# Patient Record
Sex: MALE | Race: WHITE | Employment: OTHER | URBAN - METROPOLITAN AREA
[De-identification: names, ages, dates, MRNs, and addresses within clinical notes are randomized per-mention and may not be internally consistent; named-entity substitution may affect disease eponyms.]

---

## 2019-01-15 ENCOUNTER — OFFICE VISIT (OUTPATIENT)
Dept: PHYSICAL THERAPY | Facility: HOSPITAL | Age: 69
End: 2019-01-15
Attending: PHYSICAL MEDICINE & REHABILITATION
Payer: COMMERCIAL

## 2019-01-15 ENCOUNTER — ORDER TRANSCRIPTION (OUTPATIENT)
Dept: PHYSICAL THERAPY | Facility: HOSPITAL | Age: 69
End: 2019-01-15

## 2019-01-15 DIAGNOSIS — M79.18 MYOFASCIAL PAIN: Primary | ICD-10-CM

## 2019-01-15 DIAGNOSIS — M79.18 MYOFASCIAL PAIN: ICD-10-CM

## 2019-01-15 DIAGNOSIS — M25.60 LIMITED JOINT RANGE OF MOTION: ICD-10-CM

## 2019-01-15 PROCEDURE — 97161 PT EVAL LOW COMPLEX 20 MIN: CPT

## 2019-01-15 NOTE — PROGRESS NOTES
LUMBAR SPINE EVALUATION:   Referring Physician: Dr. Shawn Cormier  Date of Onset: 5/2018 Date of Service: 1/15/2019   Diagnosis: Myofascial pain (M79.18)  Limited joint range of motion (M25.60), Quadratus, ITB syndrome  PATIENT SUMMARY:   Denise Diaz medical history was reviewed with Marino Pisano. Significant findings include: negative for . personal hx of CA, diabetes or cardiac condition      ASSESSMENT:     Marino Pisano would benefit from skilled Physical Therapy to address the above impairments to increase provocation    Frequency / Duration: Patient will be seen for 2x/week or a total of 12 visits over a 90 day period.   Treatment will include: Manual Therapy, Neuromuscular Re-education, Self-Care Home Management, Therapeutic Activities, Therapeutic Exercise

## 2019-01-17 ENCOUNTER — OFFICE VISIT (OUTPATIENT)
Dept: PHYSICAL THERAPY | Facility: HOSPITAL | Age: 69
End: 2019-01-17
Attending: PHYSICAL MEDICINE & REHABILITATION
Payer: COMMERCIAL

## 2019-01-17 PROCEDURE — 97140 MANUAL THERAPY 1/> REGIONS: CPT

## 2019-01-17 PROCEDURE — 97110 THERAPEUTIC EXERCISES: CPT

## 2019-01-17 NOTE — PROGRESS NOTES
Diagnosis:  Myofascial pain (M79.18)  Limited joint range of motion (M25.60), Quadratus, ITB syndrome    Insurance:  [facility] PPO  Authorized # of Visits:  2/12        Next MD visit: none scheduled  Fall Risk: standard         Precautions: Gentlauratrajustine 18

## 2019-01-21 ENCOUNTER — OFFICE VISIT (OUTPATIENT)
Dept: PHYSICAL THERAPY | Facility: HOSPITAL | Age: 69
End: 2019-01-21
Attending: PHYSICAL MEDICINE & REHABILITATION
Payer: COMMERCIAL

## 2019-01-21 PROCEDURE — 97140 MANUAL THERAPY 1/> REGIONS: CPT

## 2019-01-21 PROCEDURE — 97110 THERAPEUTIC EXERCISES: CPT

## 2019-01-21 NOTE — PROGRESS NOTES
Diagnosis:  Myofascial pain (M79.18)  Limited joint range of motion (M25.60), Quadratus, ITB syndrome    Insurance:  [facility] PPO  Authorized # of Visits:  3/12        Next MD visit: none scheduled  Fall Risk: standard         Precautions: Franca 18

## 2019-01-29 ENCOUNTER — OFFICE VISIT (OUTPATIENT)
Dept: PHYSICAL THERAPY | Facility: HOSPITAL | Age: 69
End: 2019-01-29
Attending: PHYSICAL MEDICINE & REHABILITATION
Payer: COMMERCIAL

## 2019-01-29 PROCEDURE — 97140 MANUAL THERAPY 1/> REGIONS: CPT

## 2019-01-29 PROCEDURE — 97110 THERAPEUTIC EXERCISES: CPT

## 2019-01-29 NOTE — PROGRESS NOTES
Diagnosis:  Myofascial pain (M79.18)  Limited joint range of motion (M25.60), Quadratus, ITB syndrome    Insurance:  [facility] PPO  Authorized # of Visits:  4/12        Next MD visit: none scheduled  Fall Risk: standard         Precautions: n/a           Medica There 1       Total Timed Treatment: 38 min  Total Treatment Time: 45 min

## 2019-01-31 ENCOUNTER — OFFICE VISIT (OUTPATIENT)
Dept: PHYSICAL THERAPY | Facility: HOSPITAL | Age: 69
End: 2019-01-31
Attending: PHYSICAL MEDICINE & REHABILITATION
Payer: COMMERCIAL

## 2019-01-31 PROCEDURE — 97110 THERAPEUTIC EXERCISES: CPT

## 2019-01-31 NOTE — PROGRESS NOTES
Diagnosis:  Myofascial pain (M79.18)  Limited joint range of motion (M25.60), Quadratus, ITB syndrome    Insurance:  [facility] PPO  Authorized # of Visits:  4/12        Next MD visit: none scheduled  Fall Risk: standard         Precautions: n/a           Medica date: Stretches: hip flexor, piriformis; knee pumps, hams stretch L, TA, piston    Assessment: Pt continues to symptoms that are aggravated by minimal increase in activity. Plan: Assess glut med. Cont US, STM, MFR. Charges:  There Ex 3     Total Joe Efrem

## 2019-02-05 ENCOUNTER — OFFICE VISIT (OUTPATIENT)
Dept: PHYSICAL THERAPY | Facility: HOSPITAL | Age: 69
End: 2019-02-05
Attending: PHYSICAL MEDICINE & REHABILITATION
Payer: COMMERCIAL

## 2019-02-05 PROCEDURE — 97110 THERAPEUTIC EXERCISES: CPT

## 2019-02-05 NOTE — PROGRESS NOTES
Diagnosis:  Myofascial pain (M79.18)  Limited joint range of motion (M25.60), Quadratus, ITB syndrome    Insurance:  [facility] PPO  Authorized # of Visits:  6/12        Next MD visit: none scheduled  Fall Risk: standard         Precautions: Franca 18 overpressure.   Trial of hips shifted with L and R with REIL - no change   Manual   STM/MFR to (R) lumbar paraspinals and QL in L sidelying  MFR only  Strain/Counterstrain with shortening through ischial tub     Neuro ReEducation        Therapeutic Activity

## 2019-02-07 ENCOUNTER — APPOINTMENT (OUTPATIENT)
Dept: PHYSICAL THERAPY | Facility: HOSPITAL | Age: 69
End: 2019-02-07
Attending: PHYSICAL MEDICINE & REHABILITATION
Payer: COMMERCIAL

## 2019-02-12 ENCOUNTER — OFFICE VISIT (OUTPATIENT)
Dept: PHYSICAL THERAPY | Facility: HOSPITAL | Age: 69
End: 2019-02-12
Attending: PHYSICAL MEDICINE & REHABILITATION
Payer: COMMERCIAL

## 2019-02-12 PROCEDURE — 97110 THERAPEUTIC EXERCISES: CPT

## 2019-02-12 NOTE — PROGRESS NOTES
Diagnosis:  Myofascial pain (M79.18)  Limited joint range of motion (M25.60), Quadratus, ITB syndrome    Insurance:  [facility] PPO  Authorized # of Visits:  7/12        Next MD visit: none scheduled  Fall Risk: standard         Precautions: n/a           Medica 5x2  QL stretch standing  REIL with self overpressure and with manual overpressure.   Trial of hips shifted with L and R with REIL - no change    Manual   STM/MFR to (R) lumbar paraspinals and QL in L sidelying  MFR only  Strain/Counterstrain with shortenin

## 2019-02-14 ENCOUNTER — APPOINTMENT (OUTPATIENT)
Dept: PHYSICAL THERAPY | Facility: HOSPITAL | Age: 69
End: 2019-02-14
Attending: PHYSICAL MEDICINE & REHABILITATION
Payer: COMMERCIAL

## 2019-02-19 ENCOUNTER — OFFICE VISIT (OUTPATIENT)
Dept: PHYSICAL THERAPY | Facility: HOSPITAL | Age: 69
End: 2019-02-19
Attending: PHYSICAL MEDICINE & REHABILITATION
Payer: COMMERCIAL

## 2019-02-19 PROCEDURE — 97110 THERAPEUTIC EXERCISES: CPT

## 2019-02-19 NOTE — PROGRESS NOTES
Diagnosis:  Myofascial pain (M79.18)  Limited joint range of motion (M25.60), Quadratus, ITB syndrome    Insurance:  [facility] PPO  Authorized # of Visits:  8/12      Through 4/15   Next MD visit: none scheduled  Fall Risk: standard         Precautions: n/a planks    Therapeutic Exercises Piriformis stretch x 2  Standing hip flexor stretch x 1  Knee pumps B x 10  TA retraining x 5  piriformis stretch and standing hip flexors stretch x 1  TA x 10  90/90 x 5  Prone sequenced gluts x 5 Piriformis stretch review

## 2019-02-21 ENCOUNTER — APPOINTMENT (OUTPATIENT)
Dept: PHYSICAL THERAPY | Facility: HOSPITAL | Age: 69
End: 2019-02-21
Attending: PHYSICAL MEDICINE & REHABILITATION
Payer: COMMERCIAL

## 2019-02-28 ENCOUNTER — OFFICE VISIT (OUTPATIENT)
Dept: PHYSICAL THERAPY | Facility: HOSPITAL | Age: 69
End: 2019-02-28
Attending: PHYSICAL MEDICINE & REHABILITATION
Payer: COMMERCIAL

## 2019-02-28 PROCEDURE — 97140 MANUAL THERAPY 1/> REGIONS: CPT

## 2019-02-28 PROCEDURE — 97110 THERAPEUTIC EXERCISES: CPT

## 2019-02-28 NOTE — PROGRESS NOTES
Diagnosis:  Myofascial pain (M79.18)  Limited joint range of motion (M25.60), Quadratus, ITB syndrome    Insurance:  [facility] PPO  Authorized # of Visits:  9/12      Through 4/15   Next MD visit: none scheduled  Fall Risk: standard         Precautions: n/a advanced himself ot bridgew with leg straigh vs with heel lift as recommended.   Pt to resume this, abd, and piston   Therapeutic Exercises Piriformis stretch x 2  Standing hip flexor stretch x 1  Knee pumps B x 10  TA retraining x 5  piriformis stretch and

## 2019-03-12 ENCOUNTER — OFFICE VISIT (OUTPATIENT)
Dept: PHYSICAL THERAPY | Facility: HOSPITAL | Age: 69
End: 2019-03-12
Attending: PHYSICAL MEDICINE & REHABILITATION
Payer: COMMERCIAL

## 2019-03-12 PROCEDURE — 97110 THERAPEUTIC EXERCISES: CPT

## 2019-03-12 PROCEDURE — 97140 MANUAL THERAPY 1/> REGIONS: CPT

## 2019-03-12 NOTE — PROGRESS NOTES
Diagnosis:  Myofascial pain (M79.18)  Limited joint range of motion (M25.60), Quadratus, ITB syndrome    Insurance:  [facility] PPO  Authorized # of Visits:  10/12      Through 4/15   Next MD visit: none scheduled  Fall Risk: standard         Precautions: n/a straigh vs with heel lift as recommended.   Pt to resume this, abd, and piston  advised to do standard bridges when resumes   Therapeutic Exercises Piriformis stretch x 2  Standing hip flexor stretch x 1  Knee pumps B x 10  TA retraining x 5  piriformis str

## 2019-03-25 ENCOUNTER — OFFICE VISIT (OUTPATIENT)
Dept: PHYSICAL THERAPY | Facility: HOSPITAL | Age: 69
End: 2019-03-25
Attending: PHYSICAL MEDICINE & REHABILITATION
Payer: COMMERCIAL

## 2019-03-25 ENCOUNTER — TELEPHONE (OUTPATIENT)
Dept: PHYSICAL THERAPY | Facility: HOSPITAL | Age: 69
End: 2019-03-25

## 2019-03-25 PROCEDURE — 97530 THERAPEUTIC ACTIVITIES: CPT

## 2019-03-25 NOTE — PROGRESS NOTES
Diagnosis:  Myofascial pain (M79.18)  Limited joint range of motion (M25.60), Quadratus, ITB syndrome    Insurance:  [facility] PPO  Authorized # of Visits:  11/12      Through 4/15   Next MD visit: none scheduled  Fall Risk: standard         Precautions: n/a bridgew with leg straigh vs with heel lift as recommended.   Pt to resume this, abd, and piston  advised to do standard bridges when resumes    Therapeutic Exercises Piriformis stretch review to be sure no lumbar motion  TA x 5  90/90 x 5  Piston 5x2  QL st

## 2019-03-26 NOTE — PROGRESS NOTES
Patient Name: Hector Myers, : 8/15/1950, MRN: [id number]   Date:  3/26/2019  Referring Physician:  Devi Hansen    Diagnosis: Myofascial pain (M79.18), Limited joint range of motion (M25.60), Quadratus, ITB syndrome    Discharge Summary met  · Patient will report increased functional activity tolerance - partially met  · Patient will be able to progress in some form of general conditioning exercise without provocation - partially met    FOTO: 53/100    Rehab Potential: good    Plan: Pt d/

## 2019-04-08 ENCOUNTER — OFFICE VISIT (OUTPATIENT)
Dept: NEUROLOGY | Facility: CLINIC | Age: 69
End: 2019-04-08
Payer: COMMERCIAL

## 2019-04-08 ENCOUNTER — HOSPITAL ENCOUNTER (OUTPATIENT)
Dept: GENERAL RADIOLOGY | Facility: HOSPITAL | Age: 69
Discharge: HOME | End: 2019-04-08
Attending: PHYSICAL MEDICINE & REHABILITATION
Payer: COMMERCIAL

## 2019-04-08 VITALS
HEIGHT: 71 IN | WEIGHT: 201 LBS | BODY MASS INDEX: 28.14 KG/M2 | RESPIRATION RATE: 20 BRPM | HEART RATE: 66 BPM | SYSTOLIC BLOOD PRESSURE: 132 MMHG | DIASTOLIC BLOOD PRESSURE: 90 MMHG

## 2019-04-08 DIAGNOSIS — R12 HEARTBURN: ICD-10-CM

## 2019-04-08 DIAGNOSIS — M25.60 LIMITED JOINT RANGE OF MOTION: Primary | ICD-10-CM

## 2019-04-08 DIAGNOSIS — M79.18 MYOFASCIAL PAIN: ICD-10-CM

## 2019-04-08 DIAGNOSIS — M25.60 LIMITED JOINT RANGE OF MOTION: ICD-10-CM

## 2019-04-08 PROBLEM — M51.37 DEGENERATION OF LUMBAR OR LUMBOSACRAL INTERVERTEBRAL DISC: Status: ACTIVE | Noted: 2017-04-11

## 2019-04-08 PROCEDURE — 73502 X-RAY EXAM HIP UNI 2-3 VIEWS: CPT | Performed by: PHYSICAL MEDICINE & REHABILITATION

## 2019-04-08 PROCEDURE — 99215 OFFICE O/P EST HI 40 MIN: CPT | Performed by: PHYSICAL MEDICINE & REHABILITATION

## 2019-04-08 RX ORDER — MELOXICAM 15 MG/1
15 TABLET ORAL DAILY
Qty: 30 TABLET | Refills: 0 | Status: SHIPPED | OUTPATIENT
Start: 2019-04-08 | End: 2019-05-06

## 2019-04-08 NOTE — PROGRESS NOTES
[address]  Progress Note    CHIEF COMPLAINT:  Patient presents with:  Low Back Pain: Pt c/o low back pain 0.5. exercise/lifting -6/7. radiates to buttocks. .Had for 12 yrs, bulge disc?, back spasm, doing cla Gastrointestinal  Bowel Incontinence: denies  Heartburn: admits  Abdominal Pain: denies  Blood in Stool : admits  Rectal Pain: denies   Hematology  Easy Bruising: denies  Easy Bleeding: denies   Genitourinary  Difficulty Urinating: denies  Bladder Incont joint range of motion  He seems to have limited hip range of motion. Occasionally intra-articular hip problems present has gluteal pain. This may also cause secondary lumbar discomfort. He does not hurt over the lumbar spine.   I am recommending a plain

## 2019-04-10 ENCOUNTER — TELEPHONE (OUTPATIENT)
Dept: NEUROLOGY | Facility: CLINIC | Age: 69
End: 2019-04-10

## 2019-04-10 PROBLEM — M25.60 LIMITED JOINT RANGE OF MOTION: Status: ACTIVE | Noted: 2019-04-10

## 2019-04-10 PROBLEM — M79.18 MYOFASCIAL PAIN: Status: ACTIVE | Noted: 2019-04-10

## 2019-04-10 PROBLEM — R12 HEARTBURN: Status: ACTIVE | Noted: 2019-04-10

## 2019-04-10 NOTE — TELEPHONE ENCOUNTER
----- Message from Nayely Fischer MD sent at 4/10/2019  5:24 PM CDT -----  I personally reviewed these plain films of the right hip April 8, 2019.   There are normal.    Please of the patient know his x-rays are normal.  I am hoping that the meloxicam is

## 2019-04-10 NOTE — TELEPHONE ENCOUNTER
Patient states pain is getting less and less to almost no pain. As of now its 0.5/10. States at [address] Rea Many did ROM with his right leg and said it wasn't good. Should he be practicing ROM exercises for his right hip?

## 2019-04-11 NOTE — TELEPHONE ENCOUNTER
Glad its so much better. I would not push the range of motion exercises. Just stick with pool exercises if available.

## 2019-04-12 ENCOUNTER — MED REC SCAN ONLY (OUTPATIENT)
Dept: NEUROLOGY | Facility: CLINIC | Age: 69
End: 2019-04-12

## 2019-05-06 ENCOUNTER — TELEPHONE (OUTPATIENT)
Dept: NEUROLOGY | Facility: CLINIC | Age: 69
End: 2019-05-06

## 2019-05-06 RX ORDER — MELOXICAM 15 MG/1
15 TABLET ORAL DAILY
Qty: 30 TABLET | Refills: 0 | Status: SHIPPED | OUTPATIENT
Start: 2019-05-06 | End: 2020-05-07

## 2019-05-06 NOTE — TELEPHONE ENCOUNTER
Refill request for meloxicam 15 mg, take 1 tab daily, #30, no refills    LOV: 4/8/19  NOV: none  Last refilled on 4/8/19    Spoke to patient. He states that he still has a few tabs left. His pain is much improved in right lower back and buttocks.  He is not

## 2019-05-06 NOTE — TELEPHONE ENCOUNTER
Informed patient of Dr. García Shows message and recommendation. Patient verbalized understanding. Did not want to make f/u appt at this time.

## 2019-05-06 NOTE — TELEPHONE ENCOUNTER
I am happy to refill one more time. See rx below. Taking this long term is not advisable though. The reason he is feeling better is likely because of the medicine. If he wants to talk about longer term plans please invite him for a follow up visit.

## 2019-05-07 ENCOUNTER — MED REC SCAN ONLY (OUTPATIENT)
Dept: NEUROLOGY | Facility: CLINIC | Age: 69
End: 2019-05-07

## 2019-06-14 ENCOUNTER — HOSPITAL ENCOUNTER (OUTPATIENT)
Dept: GENERAL RADIOLOGY | Facility: HOSPITAL | Age: 69
Discharge: HOME | End: 2019-06-14
Attending: PHYSICAL MEDICINE & REHABILITATION
Payer: COMMERCIAL

## 2019-06-14 ENCOUNTER — OFFICE VISIT (OUTPATIENT)
Dept: NEUROLOGY | Facility: CLINIC | Age: 69
End: 2019-06-14
Payer: COMMERCIAL

## 2019-06-14 VITALS
RESPIRATION RATE: 16 BRPM | DIASTOLIC BLOOD PRESSURE: 76 MMHG | HEART RATE: 80 BPM | WEIGHT: 207 LBS | BODY MASS INDEX: 27.43 KG/M2 | HEIGHT: 73 IN | SYSTOLIC BLOOD PRESSURE: 136 MMHG

## 2019-06-14 DIAGNOSIS — G89.29 CHRONIC RIGHT-SIDED LOW BACK PAIN WITHOUT SCIATICA: ICD-10-CM

## 2019-06-14 DIAGNOSIS — M47.816 LUMBAR SPONDYLOSIS: Primary | ICD-10-CM

## 2019-06-14 DIAGNOSIS — M54.50 CHRONIC RIGHT-SIDED LOW BACK PAIN WITHOUT SCIATICA: ICD-10-CM

## 2019-06-14 DIAGNOSIS — R12 HEARTBURN: ICD-10-CM

## 2019-06-14 DIAGNOSIS — M79.18 MYOFASCIAL PAIN: ICD-10-CM

## 2019-06-14 PROCEDURE — 99214 OFFICE O/P EST MOD 30 MIN: CPT | Performed by: PHYSICAL MEDICINE & REHABILITATION

## 2019-06-14 PROCEDURE — 72100 X-RAY EXAM L-S SPINE 2/3 VWS: CPT | Performed by: PHYSICAL MEDICINE & REHABILITATION

## 2019-06-14 RX ORDER — CEPHALEXIN 500 MG/1
500 CAPSULE ORAL 2 TIMES DAILY
Refills: 0 | COMMUNITY
Start: 2019-06-11 | End: 2020-05-07 | Stop reason: ALTCHOICE

## 2019-06-14 NOTE — PROGRESS NOTES
[address]  Progress Note    CHIEF COMPLAINT:  Patient presents with:  Low Back Pain: Patient presents for follow up on low back pain, LOV:4/8/19.  Patient states his pain is not better, has completed PT and Pain: denies  Irregular Heartbeat: denies   Gastrointestinal  Bowel Incontinence: denies  Heartburn: denies   Genitourinary  Difficulty Urinating: denies  Bladder Incontinence: denies   Musculoskeletal  Joint Stiffness: admits  Painful Joints: denies   Ilene consider MBB's and RFN. - XR LUMBAR SPINE (MIN 2 VIEWS) (CPT=72100); Future    4. Lumbar spondylosis  Facet injections as above. Will check a spine x-ray.   - SPECIALTY (OTHER) - INTERNAL    Orders Placed This Encounter      cephALEXin 500 MG Oral Cap

## 2019-06-17 ENCOUNTER — TELEPHONE (OUTPATIENT)
Dept: NEUROLOGY | Facility: CLINIC | Age: 69
End: 2019-06-17

## 2019-06-17 NOTE — TELEPHONE ENCOUNTER
----- Message from Jihan Paz MD sent at 6/17/2019  2:18 PM CDT -----  I personally reviewed plain film x-rays of the lumbar spine done on June 14, 2019.   These reveal disc degeneration at the thoracolumbar junction, hyperlordosis and minimal facet

## 2019-06-18 ENCOUNTER — TELEPHONE (OUTPATIENT)
Dept: NEUROLOGY | Facility: CLINIC | Age: 69
End: 2019-06-18

## 2019-06-18 NOTE — TELEPHONE ENCOUNTER
[employer] for authorization of approval for Right L4-5 and L5-S1 facet injections cpt codes [id number], 79977. Authorization iis not required. Transaction [id number]. Will  inform Nursing.

## 2019-06-18 NOTE — TELEPHONE ENCOUNTER
Patient has been scheduled for a Right L45 and L5-S1 facet injections on 6/27/19 at the [facility]. Medications and allergies reviewed. Patient informed to hold aspirins, nsaids, blood thinners, multivitamins, vitamin E and fish oils 3-7 days prior to procedure.

## 2019-06-27 ENCOUNTER — OFFICE VISIT (OUTPATIENT)
Dept: SURGERY | Facility: CLINIC | Age: 69
End: 2019-06-27

## 2019-06-27 DIAGNOSIS — M47.816 LUMBAR SPONDYLOSIS: Primary | ICD-10-CM

## 2019-06-27 PROCEDURE — 64493 INJ PARAVERT F JNT L/S 1 LEV: CPT | Performed by: PHYSICAL MEDICINE & REHABILITATION

## 2019-06-27 PROCEDURE — 64494 INJ PARAVERT F JNT L/S 2 LEV: CPT | Performed by: PHYSICAL MEDICINE & REHABILITATION

## 2019-06-27 NOTE — PROCEDURES
Preoperative Diagnosis:  (M47.816) Lumbar spondylosis  (primary encounter diagnosis)       Postoperative Diagnosis:  (M47.816) Lumbar spondylosis  (primary encounter diagnosis)       Procedures:  Right L4-5 and L5-S1 Facet injection(s) under fluoroscopic g

## 2020-05-07 ENCOUNTER — TELEPHONE (OUTPATIENT)
Dept: NEUROLOGY | Facility: CLINIC | Age: 70
End: 2020-05-07

## 2020-05-07 ENCOUNTER — TELEMEDICINE (OUTPATIENT)
Dept: NEUROLOGY | Facility: CLINIC | Age: 70
End: 2020-05-07
Payer: COMMERCIAL

## 2020-05-07 DIAGNOSIS — M47.816 LUMBAR SPONDYLOSIS: ICD-10-CM

## 2020-05-07 DIAGNOSIS — R12 HEARTBURN: ICD-10-CM

## 2020-05-07 DIAGNOSIS — M79.18 MYOFASCIAL PAIN: ICD-10-CM

## 2020-05-07 DIAGNOSIS — M79.18 GLUTEAL PAIN: Primary | ICD-10-CM

## 2020-05-07 PROCEDURE — 99214 OFFICE O/P EST MOD 30 MIN: CPT | Performed by: PHYSICAL MEDICINE & REHABILITATION

## 2020-05-07 RX ORDER — MELOXICAM 15 MG/1
15 TABLET ORAL DAILY
Qty: 30 TABLET | Refills: 1 | Status: SHIPPED | OUTPATIENT
Start: 2020-05-07 | End: 2020-08-03 | Stop reason: ALTCHOICE

## 2020-05-07 NOTE — PROGRESS NOTES
[address]    Telemedicine Visit - New Evaluation    Kristin Augustin verbally consents to a Telemedicine Visit on 05/07/20.  This visit is conducted using Telemedicine with live, interactive audio and v Never         REVIEW OF SYSTEMS:   Constitutional: Fever/chills: denies, cough: denies    Gastrointestinal: Bowel incontinence: denies, heartburn: reports    Genitourinary: Bladder incontinence: denies    Musculoskeletal: As per HPI    Neurological: As per visit.    2. Lumbar spondylosis  Responded exquisitely well to facet injections about 1 year ago    3. Myofascial pain  This may also be myofascial    4. Heartburn  NSAIDs relatively contraindicated due to history of upper GI disease.   We will cautiously p

## 2020-08-03 ENCOUNTER — OFFICE VISIT (OUTPATIENT)
Dept: NEUROLOGY | Facility: CLINIC | Age: 70
End: 2020-08-03
Payer: COMMERCIAL

## 2020-08-03 VITALS — WEIGHT: 200 LBS | BODY MASS INDEX: 26.51 KG/M2 | HEIGHT: 73 IN

## 2020-08-03 DIAGNOSIS — M79.18 MYOFASCIAL PAIN: Primary | ICD-10-CM

## 2020-08-03 DIAGNOSIS — R12 HEARTBURN: ICD-10-CM

## 2020-08-03 PROCEDURE — 99214 OFFICE O/P EST MOD 30 MIN: CPT | Performed by: PHYSICAL MEDICINE & REHABILITATION

## 2020-08-03 PROCEDURE — 3008F BODY MASS INDEX DOCD: CPT | Performed by: PHYSICAL MEDICINE & REHABILITATION

## 2020-08-03 PROCEDURE — 20552 NJX 1/MLT TRIGGER POINT 1/2: CPT | Performed by: PHYSICAL MEDICINE & REHABILITATION

## 2020-08-03 RX ORDER — LIDOCAINE HYDROCHLORIDE 10 MG/ML
2 INJECTION, SOLUTION EPIDURAL; INFILTRATION; INTRACAUDAL; PERINEURAL ONCE
Status: SHIPPED | OUTPATIENT
Start: 2020-08-03

## 2020-08-03 RX ORDER — LIDOCAINE HYDROCHLORIDE 10 MG/ML
1 INJECTION, SOLUTION INFILTRATION; PERINEURAL ONCE
Status: COMPLETED | OUTPATIENT
Start: 2020-08-03 | End: 2020-08-03

## 2020-08-03 RX ORDER — TRIAMCINOLONE ACETONIDE 40 MG/ML
40 INJECTION, SUSPENSION INTRA-ARTICULAR; INTRAMUSCULAR ONCE
Status: COMPLETED | OUTPATIENT
Start: 2020-08-03 | End: 2020-08-03

## 2020-08-03 RX ORDER — IBUPROFEN 200 MG
200 TABLET ORAL EVERY 6 HOURS PRN
COMMUNITY

## 2020-08-03 NOTE — PROGRESS NOTES
[address]  Progress Note    CHIEF COMPLAINT:  Patient presents with:  Low Back Pain: Patient presents top follow up on telemedicine visit 05/07/2020 for gluteal pain.  Patient sttaes that meloxicam reduced t denies   Gastrointestinal  Bowel Incontinence: denies  Heartburn: denies   Genitourinary  Difficulty Urinating: denies  Bladder Incontinence: denies   Musculoskeletal  Joint Stiffness: admits(right hip)  Painful Joints: denies   Neurological  Loss of Stren treatment options.     Orders Placed This Encounter      ibuprofen 200 MG Oral Tab      triamcinolone acetonide (KENALOG-10) injection 10 mg/mL      lidocaine PF (XYLOCAINE) 1% injection      triamcinolone acetonide (KENALOG-40) 40 MG/ML injection 40 mg

## 2020-08-04 ENCOUNTER — MED REC SCAN ONLY (OUTPATIENT)
Dept: NEUROLOGY | Facility: CLINIC | Age: 70
End: 2020-08-04

## 2020-08-04 ENCOUNTER — TELEPHONE (OUTPATIENT)
Dept: NEUROLOGY | Facility: CLINIC | Age: 70
End: 2020-08-04

## 2020-08-04 NOTE — TELEPHONE ENCOUNTER
Trigger point injection right glut binta  CPT Code: 54723-WMUAAHRR  Called [facility]  for authorization of approval for above. Spoke to Robert SMART who states no authorization is required. Reference [id number].  Will inform [place]

## 2020-08-04 NOTE — PROCEDURES
Trigger point injection  Location:  right deep gluteals  After discussing benefits and possible side effects, we proceeded with a trigger point injection. The patient was consented. The patient was seated, and the muscle was palpated.  The skin was sterile

## (undated) NOTE — LETTER
[facility] SURGERY SCHEDULING FORM   [address] [place] [facility]   [phone] (scheduling phone) [phone] (scheduling fax)     PATIENT INFORMATION   Last Name:      Rizwan Tadeo      First Name:    Maddi Basilio Allergies: Patient has no known allergies.          Completed by:    Eulalio Valentino      Date:    6/18/2019

## (undated) NOTE — LETTER
[address]   Date:   6/14/2019     Name:   Libertad Wakefield    YOB: 1950   MRN:   [id number]       WHERE IS YOUR PAIN NOW? Dieter the areas on your body where you feel the described sensations.   Use the appropri

## (undated) NOTE — LETTER
[facility] [place] [address], [place]   Date:   8/3/2020     Name:   Asia Griffith    YOB: 1950   MRN:   [id number]       WHERE IS YOUR PAIN NOW?   Dieter the areas on your body where you feel the described sensatio

## (undated) NOTE — LETTER
AUTHORIZATION FOR SURGICAL OPERATION OR OTHER PROCEDURE    1. I hereby authorize Dr. Gaye Romero, and [facility] staff assigned to my case to perform the following operation and/or procedure at the [facility]: Trigger point injections        2.   [place] []  Parent    Responsible person                          []  Spouse  In case of minor or                    [] Other  _____________   Incompetent name:  __________________________________________________                               (please prin